# Patient Record
Sex: MALE | Race: BLACK OR AFRICAN AMERICAN | NOT HISPANIC OR LATINO | Employment: UNEMPLOYED | URBAN - METROPOLITAN AREA
[De-identification: names, ages, dates, MRNs, and addresses within clinical notes are randomized per-mention and may not be internally consistent; named-entity substitution may affect disease eponyms.]

---

## 2022-08-13 ENCOUNTER — HOSPITAL ENCOUNTER (EMERGENCY)
Facility: HOSPITAL | Age: 22
Discharge: HOME | End: 2022-08-13
Attending: FAMILY MEDICINE
Payer: MEDICAID

## 2022-08-13 VITALS
OXYGEN SATURATION: 100 % | DIASTOLIC BLOOD PRESSURE: 75 MMHG | HEIGHT: 66 IN | HEART RATE: 88 BPM | RESPIRATION RATE: 16 BRPM | WEIGHT: 128.31 LBS | BODY MASS INDEX: 20.62 KG/M2 | SYSTOLIC BLOOD PRESSURE: 121 MMHG | TEMPERATURE: 98 F

## 2022-08-13 DIAGNOSIS — R36.9 PENILE DISCHARGE: Primary | ICD-10-CM

## 2022-08-13 PROCEDURE — 63600175 PHARM REV CODE 636 W HCPCS: Performed by: NURSE PRACTITIONER

## 2022-08-13 PROCEDURE — 99284 EMERGENCY DEPT VISIT MOD MDM: CPT

## 2022-08-13 PROCEDURE — 25000003 PHARM REV CODE 250: Performed by: NURSE PRACTITIONER

## 2022-08-13 PROCEDURE — 96372 THER/PROPH/DIAG INJ SC/IM: CPT | Performed by: NURSE PRACTITIONER

## 2022-08-13 RX ORDER — DOXYCYCLINE 100 MG/1
100 CAPSULE ORAL EVERY 12 HOURS
Qty: 14 CAPSULE | Refills: 0 | Status: SHIPPED | OUTPATIENT
Start: 2022-08-13 | End: 2022-08-20

## 2022-08-13 RX ORDER — CEFTRIAXONE 500 MG/1
500 INJECTION, POWDER, FOR SOLUTION INTRAMUSCULAR; INTRAVENOUS
Status: COMPLETED | OUTPATIENT
Start: 2022-08-13 | End: 2022-08-13

## 2022-08-13 RX ORDER — LIDOCAINE HYDROCHLORIDE 10 MG/ML
5 INJECTION, SOLUTION EPIDURAL; INFILTRATION; INTRACAUDAL; PERINEURAL
Status: COMPLETED | OUTPATIENT
Start: 2022-08-13 | End: 2022-08-13

## 2022-08-13 RX ADMIN — LIDOCAINE HYDROCHLORIDE 50 MG: 10 INJECTION, SOLUTION EPIDURAL; INFILTRATION; INTRACAUDAL; PERINEURAL at 06:08

## 2022-08-13 RX ADMIN — CEFTRIAXONE SODIUM 500 MG: 500 INJECTION, POWDER, FOR SOLUTION INTRAMUSCULAR; INTRAVENOUS at 06:08

## 2022-08-13 NOTE — ED PROVIDER NOTES
Encounter Date: 8/13/2022       History     Chief Complaint   Patient presents with    Penile Discharge     Patient reports pain with urination and white discharge from penis for 2 days     The patient presents with urethral discharge and with dysuria.  The onset was 2 days ago.  The course/duration of symptoms is constant.  Location: penis. The character of symptoms is discharge: white and burning.  The degree of symptoms is minimal.  The exacerbating factor is urination.  The relieving factor is none.  Risk factors consist of none.  Prior episodes: none.  Therapy today: none.  Associated symptoms: denies abdominal pain, denies flank pain, denies pelvic pain, denies back pain, denies fever, denies chills, denies nausea, denies vomiting and denies hematuria.  Additional history: sexual history: unprotected intercourse.           Review of patient's allergies indicates:  No Known Allergies  History reviewed. No pertinent past medical history.  History reviewed. No pertinent surgical history.  History reviewed. No pertinent family history.     Review of Systems   Constitutional: Negative for fever.   HENT: Negative for sore throat.    Respiratory: Negative for shortness of breath.    Cardiovascular: Negative for chest pain.   Gastrointestinal: Negative for nausea.   Genitourinary: Positive for dysuria and penile discharge.   Musculoskeletal: Negative for back pain.   Skin: Negative for rash.   Neurological: Negative for weakness.   Hematological: Does not bruise/bleed easily.   All other systems reviewed and are negative.      Physical Exam     Initial Vitals [08/13/22 1659]   BP Pulse Resp Temp SpO2   121/75 88 16 98.4 °F (36.9 °C) 100 %      MAP       --         Physical Exam    Nursing note and vitals reviewed.  Constitutional: He appears well-developed and well-nourished.   HENT:   Head: Normocephalic and atraumatic.   Neck: Neck supple.   Normal range of motion.  Cardiovascular: Normal rate, regular rhythm,  normal heart sounds and intact distal pulses.   Pulmonary/Chest: Breath sounds normal.   Abdominal: Abdomen is soft. Bowel sounds are normal.   Genitourinary:    Testes normal.   Cremasteric reflex is present. Discharge found.   Musculoskeletal:         General: Normal range of motion.      Cervical back: Normal range of motion and neck supple.     Neurological: He is alert and oriented to person, place, and time. He has normal strength.   Skin: Skin is warm and dry.   Psychiatric: He has a normal mood and affect.         ED Course   Procedures  Labs Reviewed - No data to display       Imaging Results    None          Medications   cefTRIAXone injection 500 mg (has no administration in time range)     Medical Decision Making:   History:   Old Records Summarized: records from clinic visits and records from previous admission(s).  5:44 PM DISPOSITION: The patient is resting comfortably in no acute distress.  He is hemodynamically stable and is without objective evidence for acute process requiring urgent intervention or hospitalization. I provided counseling to patient with regard to condition, the treatment plan, specific conditions for return, and the importance of follow up. Detailed written and verbal instructions provided to patient and he expressed a verbal understanding of the discharge instructions and overall management plan. Reiterated the importance of medication administration and safety and advised patient to follow up with primary care provider in 3-5 days or sooner if needed.  Answered questions at this time. The patient is stable for discharge.                         Clinical Impression:   Final diagnoses:  [R36.9] Penile discharge (Primary)          ED Disposition Condition    Discharge Stable        ED Prescriptions     Medication Sig Dispense Start Date End Date Auth. Provider    doxycycline (VIBRAMYCIN) 100 MG Cap Take 1 capsule (100 mg total) by mouth every 12 (twelve) hours. for 7 days 14  capsule 8/13/2022 8/20/2022 GISELE Rivera        Follow-up Information     Follow up With Specialties Details Why Contact Info    follow up with the [facility] unit in 3-5 days        [facility] Emergency Medicine  If symptoms worsen [address] [facility] [address] [phone]           GISELE Rivera  08/13/22 1743

## 2023-07-15 ENCOUNTER — HOSPITAL ENCOUNTER (EMERGENCY)
Facility: HOSPITAL | Age: 23
Discharge: HOME | End: 2023-07-15
Attending: INTERNAL MEDICINE
Payer: MEDICAID

## 2023-07-15 VITALS
DIASTOLIC BLOOD PRESSURE: 66 MMHG | BODY MASS INDEX: 21.1 KG/M2 | HEIGHT: 66 IN | RESPIRATION RATE: 20 BRPM | SYSTOLIC BLOOD PRESSURE: 125 MMHG | OXYGEN SATURATION: 100 % | WEIGHT: 131.31 LBS | TEMPERATURE: 98 F | HEART RATE: 79 BPM

## 2023-07-15 DIAGNOSIS — K04.7 DENTAL ABSCESS: ICD-10-CM

## 2023-07-15 DIAGNOSIS — K02.9 DENTAL CARIES: ICD-10-CM

## 2023-07-15 DIAGNOSIS — K08.89 PAIN, DENTAL: Primary | ICD-10-CM

## 2023-07-15 PROCEDURE — 99284 EMERGENCY DEPT VISIT MOD MDM: CPT

## 2023-07-15 RX ORDER — HYDROCODONE BITARTRATE AND ACETAMINOPHEN 5; 325 MG/1; MG/1
1 TABLET ORAL EVERY 6 HOURS PRN
Qty: 12 TABLET | Refills: 0 | Status: SHIPPED | OUTPATIENT
Start: 2023-07-15

## 2023-07-15 RX ORDER — AMOXICILLIN AND CLAVULANATE POTASSIUM 875; 125 MG/1; MG/1
1 TABLET, FILM COATED ORAL 2 TIMES DAILY
Qty: 20 TABLET | Refills: 0 | Status: SHIPPED | OUTPATIENT
Start: 2023-07-15

## 2023-07-15 NOTE — ED PROVIDER NOTES
Encounter Date: 7/15/2023       History     Chief Complaint   Patient presents with    Dental Pain     RL dental pain.      The patient presents with dental pain.  The onset was 2 days ago.  The course/duration of symptoms is constant.  Type of injury: none.  Location: Right lower. The character of symptoms is pain and swelling.  The degree of pain is moderate.  The exacerbating factor is eating.  The relieving factor is none.  Risk factors consist of none.  Prior episodes: occasional.  Therapy today: none.  Associated symptoms: none, denies fever, denies chills, denies nausea, denies vomiting, denies sore throat and denies cough.  Additional history: none.       Review of patient's allergies indicates:  No Known Allergies  History reviewed. No pertinent past medical history.  History reviewed. No pertinent surgical history.  History reviewed. No pertinent family history.     Review of Systems   Constitutional:  Negative for fever.   HENT:  Positive for dental problem. Negative for sore throat.    Respiratory:  Negative for shortness of breath.    Cardiovascular:  Negative for chest pain.   Gastrointestinal:  Negative for nausea.   Genitourinary:  Negative for dysuria.   Musculoskeletal:  Negative for back pain.   Skin:  Negative for rash.   Neurological:  Negative for weakness.   Hematological:  Does not bruise/bleed easily.   All other systems reviewed and are negative.    Physical Exam     Initial Vitals [07/15/23 1509]   BP Pulse Resp Temp SpO2   125/66 79 20 97.7 °F (36.5 °C) 100 %      MAP       --         Physical Exam    Nursing note and vitals reviewed.  Constitutional: He appears well-developed and well-nourished.   HENT:   Head: Normocephalic and atraumatic.   Right Ear: Tympanic membrane normal.   Left Ear: Tympanic membrane normal.   Nose: Nose normal.   Mouth/Throat: Uvula is midline, oropharynx is clear and moist and mucous membranes are normal.   Mild right lower dental abscess and dental caries    Neck: Neck supple.   Normal range of motion.  Cardiovascular:  Normal rate, regular rhythm, normal heart sounds and intact distal pulses.           Pulmonary/Chest: Breath sounds normal.   Abdominal: Abdomen is soft. Bowel sounds are normal.   Musculoskeletal:         General: Normal range of motion.      Cervical back: Normal range of motion and neck supple.     Neurological: He is alert and oriented to person, place, and time. He has normal strength.   Skin: Skin is warm and dry.   Psychiatric: He has a normal mood and affect.       ED Course   Procedures  Labs Reviewed - No data to display       Imaging Results    None          Medications - No data to display  Medical Decision Making:   History:   Old Records Summarized: records from clinic visits and records from previous admission(s).  Initial Assessment:   The patient presents with dental pain.  The onset was 2 days ago.  The course/duration of symptoms is constant.  Type of injury: none.  Location: Right lower. The character of symptoms is pain and swelling.  The degree of pain is moderate.  The exacerbating factor is eating.  The relieving factor is none.  Risk factors consist of none.  Prior episodes: occasional.  Therapy today: none.  Associated symptoms: none, denies fever, denies chills, denies nausea, denies vomiting, denies sore throat and denies cough.  Additional history: none.       Differential Diagnosis:   Dental abscess, facial bones infections, soft tissue facial infections, osteomyelitis, facial bones fractures, among others   ED Management:  Will prescribe augmentin and norco, he was given a list of local dental clinics  3:43 PM DISPOSITION: The patient is resting comfortably in no acute distress.  He is hemodynamically stable and is without objective evidence for acute process requiring urgent intervention or hospitalization. I provided counseling to patient with regard to condition, the treatment plan, specific conditions for return, and  the importance of follow up. Detailed written and verbal instructions provided to patient and he expressed a verbal understanding of the discharge instructions and overall management plan. Reiterated the importance of medication administration and safety and advised patient to follow up with primary care provider in 3-5 days or sooner if needed.  Answered questions at this time. The patient is stable for discharge.                           Clinical Impression:   Final diagnoses:  [K08.89] Pain, dental (Primary)  [K04.7] Dental abscess  [K02.9] Dental caries        ED Disposition Condition    Discharge Stable          ED Prescriptions       Medication Sig Dispense Start Date End Date Auth. Provider    amoxicillin-clavulanate 875-125mg (AUGMENTIN) 875-125 mg per tablet Take 1 tablet by mouth 2 (two) times daily. 20 tablet 7/15/2023 -- GISELE Rivera    HYDROcodone-acetaminophen (NORCO) 5-325 mg per tablet Take 1 tablet by mouth every 6 (six) hours as needed for Pain. 12 tablet 7/15/2023 -- GISELE Rivera          Follow-up Information       Follow up With Specialties Details Why Contact Info    follow up with dentist of choice in 2-3 days        [facility] Emergency Medicine  If symptoms worsen [address] [facility] [address] [phone]             GISELE Rivera  07/15/23 0824

## 2023-10-17 ENCOUNTER — HOSPITAL ENCOUNTER (EMERGENCY)
Facility: HOSPITAL | Age: 23
Discharge: HOME | End: 2023-10-17
Attending: INTERNAL MEDICINE
Payer: MEDICAID

## 2023-10-17 VITALS
DIASTOLIC BLOOD PRESSURE: 72 MMHG | OXYGEN SATURATION: 100 % | RESPIRATION RATE: 17 BRPM | SYSTOLIC BLOOD PRESSURE: 126 MMHG | TEMPERATURE: 97 F | HEART RATE: 90 BPM

## 2023-10-17 DIAGNOSIS — S60.419A ABRASION OF MULTIPLE SITES OF RIGHT HAND AND FINGER, INITIAL ENCOUNTER: Primary | ICD-10-CM

## 2023-10-17 DIAGNOSIS — S60.511A ABRASION OF MULTIPLE SITES OF RIGHT HAND AND FINGER, INITIAL ENCOUNTER: Primary | ICD-10-CM

## 2023-10-17 LAB — POCT GLUCOSE: 133 MG/DL (ref 70–110)

## 2023-10-17 PROCEDURE — 82962 GLUCOSE BLOOD TEST: CPT

## 2023-10-17 PROCEDURE — 99284 EMERGENCY DEPT VISIT MOD MDM: CPT | Mod: 25

## 2023-10-17 PROCEDURE — 25000003 PHARM REV CODE 250: Performed by: INTERNAL MEDICINE

## 2023-10-17 RX ORDER — BACITRACIN ZINC 500 UNIT/G
OINTMENT (GRAM) TOPICAL 2 TIMES DAILY
Qty: 28 G | Refills: 1 | Status: SHIPPED | OUTPATIENT
Start: 2023-10-17 | End: 2023-10-24

## 2023-10-17 RX ADMIN — BACITRACIN ZINC, NEOMYCIN SULFATE , POLYMYXIN B SULFATE. 1 EACH: 400; 3.5; 5 OINTMENT TOPICAL at 03:10

## 2023-10-17 NOTE — ED NOTES
Patient refused saline lock, refused Tdap vaccine, refused ordered labs. Dr. Hurley made aware.
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
## 2024-02-07 ENCOUNTER — HOSPITAL ENCOUNTER (EMERGENCY)
Facility: HOSPITAL | Age: 24
Discharge: HOME | End: 2024-02-07
Attending: FAMILY MEDICINE
Payer: MEDICAID

## 2024-02-07 VITALS
HEIGHT: 66 IN | DIASTOLIC BLOOD PRESSURE: 63 MMHG | WEIGHT: 132.06 LBS | BODY MASS INDEX: 21.22 KG/M2 | OXYGEN SATURATION: 98 % | RESPIRATION RATE: 18 BRPM | SYSTOLIC BLOOD PRESSURE: 137 MMHG | HEART RATE: 100 BPM | TEMPERATURE: 98 F

## 2024-02-07 DIAGNOSIS — S80.812A ABRASION OF LEFT LEG, INITIAL ENCOUNTER: Primary | ICD-10-CM

## 2024-02-07 DIAGNOSIS — S80.12XA CONTUSION OF LEFT LOWER EXTREMITY, INITIAL ENCOUNTER: ICD-10-CM

## 2024-02-07 DIAGNOSIS — L73.9 FOLLICULITIS: ICD-10-CM

## 2024-02-07 PROCEDURE — 99284 EMERGENCY DEPT VISIT MOD MDM: CPT

## 2024-02-07 RX ORDER — NAPROXEN 500 MG/1
500 TABLET ORAL 2 TIMES DAILY PRN
Qty: 20 TABLET | Refills: 0 | Status: SHIPPED | OUTPATIENT
Start: 2024-02-07 | End: 2024-02-17

## 2024-02-07 RX ORDER — SULFAMETHOXAZOLE AND TRIMETHOPRIM 800; 160 MG/1; MG/1
1 TABLET ORAL 2 TIMES DAILY
Qty: 20 TABLET | Refills: 0 | Status: SHIPPED | OUTPATIENT
Start: 2024-02-07 | End: 2024-02-17

## 2024-02-07 NOTE — Clinical Note
"Wu Perez" Keegan was seen and treated in our emergency department on 2/7/2024.  He may return to work on 02/09/2024.       If you have any questions or concerns, please don't hesitate to call.      Fred Camarena MD"

## 2024-02-08 NOTE — DISCHARGE INSTRUCTIONS
Sexually Transmitted Disease    Please contact:  [facility]    Physical Address  [address], [facility] [place] [id number]   Contact Information  [phone]           Sexually transmitted disease (STD) refers to any infection that is passed from person to person during sexual activity. This may happen by way of saliva, semen, blood, vaginal mucus, or urine. Common STDs include:     Gonorrhea.      Chlamydia.      Syphilis.      HIV/AIDS.      Genital herpes.      Hepatitis B and C.      Trichomonas.      Human papillomavirus (HPV).      Pubic lice.      CAUSES   An STD may be spread by bacteria, virus, or parasite. A person can get an STD by:     Sexual intercourse with an infected person.      Sharing sex toys with an infected person.      Sharing needles with an infected person.      Having intimate contact with the genitals, mouth, or rectal areas of an infected person.      SYMPTOMS   Some people may not have any symptoms, but they can still pass the infection to others. Different STDs have different symptoms. Symptoms include:     Painful or bloody urination.      Pain in the pelvis, abdomen, vagina, anus, throat, or eyes.       Skin rash, itching, irritation, growths, or sores (lesions ). These usually occur in the genital or anal area.      Abnormal vaginal discharge.      Penile discharge in men.      Soft, flesh-colored skin growths in the genital or anal area.      Fever.      Pain or bleeding during sexual intercourse.      Swollen glands in the groin area.      Yellow skin and eyes (jaundice ). This is seen with hepatitis.      DIAGNOSIS   To make a diagnosis, your caregiver may:     Take a medical history.      Perform a physical exam.      Take a specimen (culture ) to be examined.      Examine a sample of discharge under a microscope.      Perform blood tests.      Perform a Pap test, if this applies.       Perform a colposcopy.       Perform a laparoscopy.       TREATMENT     Chlamydia, gonorrhea, trichomonas, and syphilis can be cured with antibiotic medicine.      Genital herpes, hepatitis, and HIV can be treated, but not cured, with prescribed medicines. The medicines will lessen the symptoms.       Genital warts from HPV can be treated with medicine or by freezing, burning (electrocautery ), or surgery. Warts may come back.       HPV is a virus and cannot be cured with medicine or surgery. However, abnormal areas may be followed very closely by your caregiver and may be removed from the cervix, vagina, or vulva through office procedures or surgery.    If your diagnosis is confirmed, your recent sexual partners need treatment. This is true even if they are symptom-free or have a negative culture or evaluation. They should not have sex until their caregiver says it is okay.     HOME CARE INSTRUCTIONS     All sexual partners should be informed, tested, and treated for all STDs.      Take your antibiotics as directed. Finish them even if you start to feel better.       Only take over-the-counter or prescription medicines for pain, discomfort, or fever as directed by your caregiver.       Rest.      Eat a balanced diet and drink enough fluids to keep your urine clear or pale yellow.      Do not have sex until treatment is completed and you have followed up with your caregiver. STDs should be checked after treatment.      Keep all follow-up appointments, Pap tests, and blood tests as directed by your caregiver.       Only use latex condoms and water-soluble lubricants during sexual activity. Do not use petroleum jelly or oils.      Avoid alcohol and illegal drugs.      Get vaccinated for HPV and hepatitis. If you have not received these vaccines in the past, talk to your caregiver about whether one or both might be right for you.      Avoid risky sex practices that can break the skin.    The only way to avoid getting an STD is to avoid all sexual activity. Latex condoms  and dental dams (for oral sex) will help lessen the risk of getting an STD, but will not completely eliminate the risk.     SEEK MEDICAL CARE IF:     You have a fever.      You have any new or worsening symptoms.      Document Released: 03/09/2004 Document Revised: 03/11/2013 Document Reviewed: 03/16/2012  [employer]® Patient Information ©2014 [employer], [employer].

## 2024-02-08 NOTE — ED PROVIDER NOTES
"Encounter Date: 2/7/2024       History     Chief Complaint   Patient presents with    Leg Injury     Pt. States he caught L leg in [employer] between some racks. 2 abrasions noted to L shin. Able to ambulate without difficulty     Testicle Pain     Pt. Endorses having a "cyst" on testicles that popped. Requesting STD screen. Denies dysuria/discharge     Patient is a 23-year-old gentleman presents emergency room for evaluation with complaints of an injury that happened at work yesterday.  Patient reports getting caught between a rack and a [employer], resulting in an abrasion to the left anterior calf region, also bruising to the left thigh.  Patient was able to continue working yesterday as well as today, but was informed by his work to come the emergency room for evaluation.  Patient is able to bear weight on his left lower extremity.  Patient also reports having a boil on the right groin area which he was able to pop with a needle, but wanted that to be evaluated as well.  Patient also interested in having STD check, but denies dysuria or urethral discharge.    The history is provided by the patient.     Review of patient's allergies indicates:  No Known Allergies  History reviewed. No pertinent past medical history.  History reviewed. No pertinent surgical history.  History reviewed. No pertinent family history.  Social History     Tobacco Use    Smoking status: Never    Smokeless tobacco: Never   Substance Use Topics    Alcohol use: Yes    Drug use: Never     Review of Systems   Constitutional:  Negative for chills, fatigue and fever.   HENT:  Negative for ear pain, rhinorrhea and sore throat.    Eyes:  Negative for photophobia and pain.   Respiratory:  Negative for cough, shortness of breath and wheezing.    Cardiovascular:  Negative for chest pain.   Gastrointestinal:  Negative for abdominal pain, diarrhea, nausea and vomiting.   Genitourinary:  Negative for dysuria.   Neurological:  Negative for dizziness, " weakness and headaches.   All other systems reviewed and are negative.      Physical Exam     Initial Vitals [02/07/24 2138]   BP Pulse Resp Temp SpO2   137/63 100 18 97.7 °F (36.5 °C) 98 %      MAP       --         Physical Exam    Nursing note and vitals reviewed.  Constitutional: He appears well-developed and well-nourished.   HENT:   Head: Normocephalic and atraumatic.   Eyes: EOM are normal. Pupils are equal, round, and reactive to light.   Neck: Neck supple.   Normal range of motion.  Cardiovascular:  Normal rate, regular rhythm, normal heart sounds and intact distal pulses.     Exam reveals no gallop and no friction rub.       No murmur heard.  Pulmonary/Chest: Breath sounds normal. No respiratory distress. He has no wheezes. He has no rhonchi. He has no rales.   Abdominal: Abdomen is soft. Bowel sounds are normal. He exhibits no distension. There is no abdominal tenderness.   Genitourinary:    Genitourinary Comments: 0.25 x 0.25 cm papule with no surrounding erythema in the right groin area.  Findings consistent with folliculitis versus small abscess.     Musculoskeletal:         General: Normal range of motion.      Cervical back: Normal range of motion and neck supple.      Comments: Patient has 2 superficial abrasions to the left anterior calf region.  No bony tenderness to palpation of the tibia.  Superficial bruising of the left medial distal thigh.  No soft tissue swelling.  Full range of motion left knee without restriction.  No knee effusion.  No medial or lateral joint line tenderness.  Negative Lachman's.     Neurological: He is alert and oriented to person, place, and time. He has normal strength.   Skin: Skin is warm and dry. Capillary refill takes less than 2 seconds.   Psychiatric: He has a normal mood and affect. His behavior is normal. Judgment and thought content normal.         ED Course   Procedures  Labs Reviewed - No data to display       Imaging Results    None          Medications - No  data to display  Medical Decision Making  Patient is a 23-year-old gentleman presents emergency room with a abrasion and contusion of the left lower extremity.  Currently able to weight bear.  No evidence of fracture with patient being able to weight bear without difficulty    Patient has a 0.25 x 0.25 cm papule with no surrounding erythema in the right groin area.  Findings consistent with folliculitis versus small abscess.    STD check:  Patient denies urethral discharge.  Given information for the Health Unit.    Risk  Prescription drug management.                                      Clinical Impression:  Final diagnoses:  [S80.812A] Abrasion of left leg, initial encounter (Primary)  [S80.12XA] Contusion of left lower extremity, initial encounter  [L73.9] Folliculitis          ED Disposition Condition    Discharge Stable          ED Prescriptions       Medication Sig Dispense Start Date End Date Auth. Provider    sulfamethoxazole-trimethoprim 800-160mg (BACTRIM DS) 800-160 mg Tab Take 1 tablet by mouth 2 (two) times daily. for 10 days 20 tablet 2/7/2024 2/17/2024 Fred Camarena MD    naproxen (NAPROSYN) 500 MG tablet Take 1 tablet (500 mg total) by mouth 2 (two) times daily as needed (pain). 20 tablet 2/7/2024 2/17/2024 Fred Camarena MD          Follow-up Information       Follow up With Specialties Details Why Contact Info    [facility] Emergency Medicine  As needed, If symptoms worsen [address] [facility] [address] [phone]    Primary Care Physician  In 5 days               Fred Camarena MD  02/07/24 7081

## 2025-04-28 ENCOUNTER — OFFICE VISIT (OUTPATIENT)
Dept: URGENT CARE | Facility: CLINIC | Age: 25
End: 2025-04-28
Payer: MEDICAID

## 2025-04-28 VITALS
OXYGEN SATURATION: 100 % | WEIGHT: 129.19 LBS | TEMPERATURE: 98 F | RESPIRATION RATE: 16 BRPM | HEIGHT: 67 IN | BODY MASS INDEX: 20.28 KG/M2 | SYSTOLIC BLOOD PRESSURE: 121 MMHG | DIASTOLIC BLOOD PRESSURE: 71 MMHG | HEART RATE: 73 BPM

## 2025-04-28 DIAGNOSIS — R09.89 SYMPTOMS OF UPPER RESPIRATORY INFECTION (URI): Primary | ICD-10-CM

## 2025-04-28 DIAGNOSIS — Z20.2 POSSIBLE EXPOSURE TO STD: ICD-10-CM

## 2025-04-28 LAB
CTP QC/QA: YES
CTP QC/QA: YES
POC MOLECULAR INFLUENZA A AGN: NEGATIVE
POC MOLECULAR INFLUENZA B AGN: NEGATIVE
SARS-COV-2 RDRP RESP QL NAA+PROBE: NEGATIVE

## 2025-04-28 PROCEDURE — 87635 SARS-COV-2 COVID-19 AMP PRB: CPT | Mod: PBBFAC

## 2025-04-28 PROCEDURE — 87591 N.GONORRHOEAE DNA AMP PROB: CPT

## 2025-04-28 PROCEDURE — 87502 INFLUENZA DNA AMP PROBE: CPT | Mod: PBBFAC

## 2025-04-28 PROCEDURE — 99213 OFFICE O/P EST LOW 20 MIN: CPT | Mod: S$PBB,,,

## 2025-04-28 PROCEDURE — 99214 OFFICE O/P EST MOD 30 MIN: CPT | Mod: PBBFAC

## 2025-04-28 RX ORDER — PROMETHAZINE HYDROCHLORIDE AND DEXTROMETHORPHAN HYDROBROMIDE 6.25; 15 MG/5ML; MG/5ML
5 SYRUP ORAL EVERY 4 HOURS PRN
Qty: 118 ML | Refills: 0 | Status: SHIPPED | OUTPATIENT
Start: 2025-04-28 | End: 2025-05-08

## 2025-04-28 RX ORDER — CETIRIZINE HYDROCHLORIDE 10 MG/1
10 TABLET ORAL DAILY
Qty: 20 TABLET | Refills: 1 | Status: SHIPPED | OUTPATIENT
Start: 2025-04-28

## 2025-04-28 NOTE — LETTER
April 28, 2025      [facility]  [facility] [address]  Phone: [phone]       Patient: Wu Allison   YOB: 2000  Date of Visit: 04/28/2025    To Whom It May Concern:    Saira Allison  was at [facility] on 04/28/2025. The patient may return to work/school on 04/30/2025 with no restrictions. If you have any questions or concerns, or if I can be of further assistance, please do not hesitate to contact me.    Sincerely,    JANET Ochoa

## 2025-04-28 NOTE — PROGRESS NOTES
"Subjective:       Patient ID: Wu Allison is a 24 y.o. male.    Vitals:  height is 5' 6.54" (1.69 m) and weight is 58.6 kg (129 lb 3.2 oz). His temperature is 98.2 °F (36.8 °C). His blood pressure is 121/71 and his pulse is 73. His respiration is 16 and oxygen saturation is 100%.     Chief Complaint: URI (Productive cough, chest congestion x 3 days.  Also requesting Urine STI panel, urine is collected.)    24-year-old male reports to the clinic with complaints of a productive cough and chest congestion that began 3 days ago.  Patient states he has not taken any medication for this current illness and states that his symptoms seem to be worsening.  Patient states that the cough is consistent throughout the day but is worse at night or in the morning and keeps him awake at night.  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  Patient is also requesting a urine STI panel at today's visit.  Patient denies any STI symptoms such as penile discharge, genital rash, genital irritation, genital itching, genital lesions, fever, nausea, vomiting, abdominal pain, dysuria, flank pain, lower back pain.    All other systems are negative    Chart reviewed    Objective:   Physical Exam   Constitutional: He appears well-developed.  Non-toxic appearance. He does not appear ill. No distress.   HENT:   Head: Normocephalic and atraumatic.   Ears:   Right Ear: Hearing, tympanic membrane, external ear and ear canal normal.   Left Ear: Hearing, tympanic membrane, external ear and ear canal normal.   Nose: Mucosal edema and rhinorrhea present. No purulent discharge. Right sinus exhibits no maxillary sinus tenderness and no frontal sinus tenderness. Left sinus exhibits no maxillary sinus tenderness and no frontal sinus tenderness.   Mouth/Throat: Uvula is midline. Oropharyngeal exudate (Postnasal drip present), posterior oropharyngeal edema and " posterior oropharyngeal erythema present.   Eyes: Right eye exhibits no discharge. Left eye exhibits no discharge. Extraocular movement intact   Neck: Neck supple. No neck rigidity present.   Cardiovascular: Regular rhythm.   Pulmonary/Chest: Effort normal and breath sounds normal. No respiratory distress. He has no wheezes. He has no rhonchi. He has no rales.   Lymphadenopathy:     He has no cervical adenopathy.   Neurological: He is alert.   Skin: Skin is warm, dry and not diaphoretic.   Psychiatric: His behavior is normal.   Nursing note and vitals reviewed.      Assessment:     1. Symptoms of upper respiratory infection (URI)    2. Possible exposure to STD          Results for orders placed or performed in visit on 04/28/25   POCT COVID-19 Rapid Screening    Collection Time: 04/28/25 11:03 AM   Result Value Ref Range    POC Rapid COVID Negative Negative     Acceptable Yes    POCT Influenza A/B Molecular    Collection Time: 04/28/25 11:05 AM   Result Value Ref Range    POC Molecular Influenza A Ag Negative Negative    POC Molecular Influenza B Ag Negative Negative     Acceptable Yes        Plan:     Discussed COVID and flu results with patient he verbalizes understanding  Begin taking Zyrtec as needed for congestion  Begin taking promethazine DM needed as cough  We will send STI risk panel off to lab for further evaluation  Will notify patient of any positive results on testing and treat accordingly.  Patient can follow all test results on the patient portal.   Please practice safe sex and read patient education material.  Avoid sexual activity until all results are known. Follow up with PCP or return to urgent care if symptoms are not improving in several days. Go to the ER if symptoms worsen or new symptoms develop.     Symptoms of upper respiratory infection (URI)  -     POCT Influenza A/B Molecular  -     POCT COVID-19 Rapid Screening  -     promethazine-dextromethorphan  (PROMETHAZINE-DM) 6.25-15 mg/5 mL Syrp; Take 5 mLs by mouth every 4 (four) hours as needed (cough).  Dispense: 118 mL; Refill: 0  -     cetirizine (ZYRTEC) 10 MG tablet; Take 1 tablet (10 mg total) by mouth once daily.  Dispense: 20 tablet; Refill: 1    Possible exposure to STD  -     Sexually-Transmitted Infections (STIs) Increased Risk Panel        Please note: This chart was completed via voice to text dictation. It may contain typographical/word recognition errors. If there are any questions, please contact the provider for final clarification.

## 2025-04-28 NOTE — PATIENT INSTRUCTIONS
If your rhinitis is caused by allergies, try to find out what sets off (triggers) your symptoms. Take steps to avoid your triggers.  Avoid yard work. It can stir up both pollen and mould.  Do not smoke or allow others to smoke around you. If you need help quitting, talk to your doctor about stop-smoking programs and medicines. These can increase your chances of quitting for good.  Do not use aerosol sprays, cleaning products, or perfumes.  If pollen is one of your triggers, close your house and car windows during blooming season.  Clean your house often to control dust.  Keep pets outside.  If your doctor recommends over-the-counter medicines to relieve symptoms, take your medicines exactly as prescribed. Call your doctor or nurse advice line if you think you are having a problem with your medicine.  Use saline (saltwater) nasal washes to help keep your nasal passages open and wash out mucus and allergens. You can buy saline nose sprays at a grocery store or drugstore. Or you can make your own at home by adding 1 teaspoon (5 mL) of non-iodized salt and 1 teaspoon (5 mL) of baking soda to 2 cups (500 millilitres) of distilled or boiled and cooled water. If you make your own, fill a squeeze bottle or neti pot with the solution, insert the tip into your nostril, and lean over the sink. Gently squirt the solution with your mouth open and repeat on the other side.       seek immediate medical care if:  You are having trouble breathing.  Mucus from your nose gets thicker (like pus) or has new blood in it.  You have new or worse symptoms.  You do not get better as expected.

## 2025-04-29 LAB
C TRACH RRNA UR QL NAA+PROBE: NOT DETECTED
N GONORRHOEA DNA UR QL NAA+PROBE: NOT DETECTED
T VAGINALIS RRNA UR QL NAA+PROBE: NOT DETECTED

## 2025-08-08 ENCOUNTER — OFFICE VISIT (OUTPATIENT)
Dept: URGENT CARE | Facility: CLINIC | Age: 25
End: 2025-08-08
Payer: MEDICAID

## 2025-08-08 VITALS
HEART RATE: 84 BPM | HEIGHT: 66 IN | DIASTOLIC BLOOD PRESSURE: 65 MMHG | TEMPERATURE: 99 F | OXYGEN SATURATION: 99 % | RESPIRATION RATE: 16 BRPM | WEIGHT: 136.19 LBS | BODY MASS INDEX: 21.89 KG/M2 | SYSTOLIC BLOOD PRESSURE: 112 MMHG

## 2025-08-08 DIAGNOSIS — Z20.2 EXPOSURE TO TRICHOMONAS: ICD-10-CM

## 2025-08-08 DIAGNOSIS — Z20.2 POSSIBLE EXPOSURE TO STI: Primary | ICD-10-CM

## 2025-08-08 PROCEDURE — 87491 CHLMYD TRACH DNA AMP PROBE: CPT

## 2025-08-08 PROCEDURE — 99214 OFFICE O/P EST MOD 30 MIN: CPT | Mod: PBBFAC

## 2025-08-08 NOTE — PATIENT INSTRUCTIONS
Will notify patient of any positive results on testing and treat accordingly.  Patient can follow all test results on the patient portal.     Please practice safe sex and read patient education material.  Avoid sexual activity until all results are known. Follow up with PCP or return to urgent care if symptoms are not improving in several days, sooner if new or worsening symptoms develop.

## 2025-08-08 NOTE — PROGRESS NOTES
"Subjective:       Patient ID: Wu Allison is a 24 y.o. male.    Vitals:  height is 5' 6" (1.676 m) and weight is 61.8 kg (136 lb 3.2 oz). His temperature is 98.8 °F (37.1 °C). His blood pressure is 112/65 and his pulse is 84. His respiration is 16 and oxygen saturation is 99%.     Chief Complaint: Requesting STI screening. (Denies any symptoms. )    24-year-old male presents to the clinic requesting STI screening.  Patient denies any symptoms such as penile discharge, genital warts, genital rash, genital lesions, genital itching, genital irritation, dysuria, flank pain, suprapubic abdominal pain, nausea, vomiting, diarrhea.  Patient states that he recently had a partner that told him that she tested positive for Trichomonas and he would like testing at today's visit.    All other systems are negative    Chart reviewed    Objective:   Physical Exam   Constitutional: He appears well-developed.  Non-toxic appearance. He does not appear ill. No distress.   Cardiovascular: Regular rhythm.   Pulmonary/Chest: Effort normal and breath sounds normal.   Abdominal: He exhibits no distension. Soft. There is no abdominal tenderness. There is no rebound, no guarding, no left CVA tenderness and no right CVA tenderness.   Musculoskeletal: Normal range of motion.         General: Normal range of motion.   Neurological: He is alert.   Skin: Skin is warm, dry and not diaphoretic.   Nursing note and vitals reviewed.        Assessment:     1. Possible exposure to STI    2. Exposure to trichomonas            Plan:   We will send STI increase was spent off to lab further evaluation  Will notify patient of any positive results on testing and treat accordingly.  Patient can follow all test results on the patient portal.   Please practice safe sex and read patient education material.  Avoid sexual activity until all results are known. Follow up with PCP or return to urgent care if symptoms are not improving in several days, sooner if new or " worsening symptoms develop.     Possible exposure to STI  -     Sexually-Transmitted Infections (STIs) Increased Risk Panel    Exposure to trichomonas        Please note: This chart was completed via voice to text dictation. It may contain typographical/word recognition errors. If there are any questions, please contact the provider for final clarification.
